# Patient Record
Sex: MALE | Race: WHITE | Employment: OTHER | URBAN - METROPOLITAN AREA
[De-identification: names, ages, dates, MRNs, and addresses within clinical notes are randomized per-mention and may not be internally consistent; named-entity substitution may affect disease eponyms.]

---

## 2020-05-27 RX ORDER — CLINDAMYCIN PHOSPHATE 600 MG/50ML
600 INJECTION INTRAVENOUS ONCE
Status: CANCELLED | OUTPATIENT
Start: 2020-05-28

## 2020-05-28 ENCOUNTER — ANESTHESIA EVENT (OUTPATIENT)
Dept: SURGERY | Facility: HOSPITAL | Age: 50
DRG: 455 | End: 2020-05-28
Payer: COMMERCIAL

## 2020-05-28 ENCOUNTER — APPOINTMENT (OUTPATIENT)
Dept: GENERAL RADIOLOGY | Facility: HOSPITAL | Age: 50
DRG: 455 | End: 2020-05-28
Attending: ORTHOPAEDIC SURGERY
Payer: COMMERCIAL

## 2020-05-28 ENCOUNTER — HOSPITAL ENCOUNTER (INPATIENT)
Facility: HOSPITAL | Age: 50
LOS: 1 days | Discharge: HOME | DRG: 455 | End: 2020-05-29
Attending: ORTHOPAEDIC SURGERY | Admitting: ORTHOPAEDIC SURGERY
Payer: COMMERCIAL

## 2020-05-28 ENCOUNTER — ANESTHESIA (OUTPATIENT)
Dept: SURGERY | Facility: HOSPITAL | Age: 50
DRG: 455 | End: 2020-05-28
Payer: COMMERCIAL

## 2020-05-28 DIAGNOSIS — M54.16 LUMBAR RADICULOPATHY: Primary | ICD-10-CM

## 2020-05-28 PROBLEM — M43.10 SPONDYLISTHESIS: Status: ACTIVE | Noted: 2020-05-28

## 2020-05-28 PROBLEM — F32.A DEPRESSION: Chronic | Status: ACTIVE | Noted: 2020-05-28

## 2020-05-28 PROCEDURE — BR191ZZ FLUOROSCOPY OF LUMBAR SPINE USING LOW OSMOLAR CONTRAST: ICD-10-PCS | Performed by: ORTHOPAEDIC SURGERY

## 2020-05-28 PROCEDURE — 99232 SBSQ HOSP IP/OBS MODERATE 35: CPT | Performed by: HOSPITALIST

## 2020-05-28 PROCEDURE — 76000 FLUOROSCOPY <1 HR PHYS/QHP: CPT | Performed by: ORTHOPAEDIC SURGERY

## 2020-05-28 PROCEDURE — 0SG00A0 FUSION OF LUMBAR VERTEBRAL JOINT WITH INTERBODY FUSION DEVICE, ANTERIOR APPROACH, ANTERIOR COLUMN, OPEN APPROACH: ICD-10-PCS | Performed by: ORTHOPAEDIC SURGERY

## 2020-05-28 PROCEDURE — 0SG0071 FUSION OF LUMBAR VERTEBRAL JOINT WITH AUTOLOGOUS TISSUE SUBSTITUTE, POSTERIOR APPROACH, POSTERIOR COLUMN, OPEN APPROACH: ICD-10-PCS | Performed by: ORTHOPAEDIC SURGERY

## 2020-05-28 PROCEDURE — 0SB20ZZ EXCISION OF LUMBAR VERTEBRAL DISC, OPEN APPROACH: ICD-10-PCS | Performed by: ORTHOPAEDIC SURGERY

## 2020-05-28 PROCEDURE — 4A11X4G MONITORING OF PERIPHERAL NERVOUS ELECTRICAL ACTIVITY, INTRAOPERATIVE, EXTERNAL APPROACH: ICD-10-PCS | Performed by: ORTHOPAEDIC SURGERY

## 2020-05-28 DEVICE — RELINE MAS RED SCRW 6.5X50 2C: Type: IMPLANTABLE DEVICE | Status: FUNCTIONAL

## 2020-05-28 DEVICE — RELINE LCK SCRW 5.5 OPEN TULIP: Type: IMPLANTABLE DEVICE | Status: FUNCTIONAL

## 2020-05-28 DEVICE — OSTEOCEL PRO LARGE BULK BUY: Type: IMPLANTABLE DEVICE | Site: BACK | Status: FUNCTIONAL

## 2020-05-28 DEVICE — RELINE MAS TI ROD 5.5X45 LRDTC: Type: IMPLANTABLE DEVICE | Status: FUNCTIONAL

## 2020-05-28 DEVICE — IMPLANTABLE DEVICE: Type: IMPLANTABLE DEVICE | Site: BACK | Status: FUNCTIONAL

## 2020-05-28 RX ORDER — DOCUSATE SODIUM 100 MG/1
100 CAPSULE, LIQUID FILLED ORAL 2 TIMES DAILY
Status: DISCONTINUED | OUTPATIENT
Start: 2020-05-28 | End: 2020-05-29

## 2020-05-28 RX ORDER — ACETAMINOPHEN 500 MG
1000 TABLET ORAL ONCE
Status: COMPLETED | OUTPATIENT
Start: 2020-05-28 | End: 2020-05-28

## 2020-05-28 RX ORDER — MORPHINE SULFATE 4 MG/ML
2 INJECTION, SOLUTION INTRAMUSCULAR; INTRAVENOUS EVERY 10 MIN PRN
Status: DISCONTINUED | OUTPATIENT
Start: 2020-05-28 | End: 2020-05-28 | Stop reason: HOSPADM

## 2020-05-28 RX ORDER — HYDROCODONE BITARTRATE AND ACETAMINOPHEN 5; 325 MG/1; MG/1
2 TABLET ORAL AS NEEDED
Status: DISCONTINUED | OUTPATIENT
Start: 2020-05-28 | End: 2020-05-28 | Stop reason: HOSPADM

## 2020-05-28 RX ORDER — LIDOCAINE HYDROCHLORIDE 10 MG/ML
INJECTION, SOLUTION EPIDURAL; INFILTRATION; INTRACAUDAL; PERINEURAL AS NEEDED
Status: DISCONTINUED | OUTPATIENT
Start: 2020-05-28 | End: 2020-05-28 | Stop reason: SURG

## 2020-05-28 RX ORDER — METOCLOPRAMIDE HYDROCHLORIDE 5 MG/ML
10 INJECTION INTRAMUSCULAR; INTRAVENOUS EVERY 6 HOURS PRN
Status: DISCONTINUED | OUTPATIENT
Start: 2020-05-28 | End: 2020-05-29

## 2020-05-28 RX ORDER — HYDROCODONE BITARTRATE AND ACETAMINOPHEN 5; 325 MG/1; MG/1
1 TABLET ORAL EVERY 4 HOURS PRN
Status: DISCONTINUED | OUTPATIENT
Start: 2020-05-28 | End: 2020-05-29

## 2020-05-28 RX ORDER — SODIUM CHLORIDE, SODIUM LACTATE, POTASSIUM CHLORIDE, CALCIUM CHLORIDE 600; 310; 30; 20 MG/100ML; MG/100ML; MG/100ML; MG/100ML
INJECTION, SOLUTION INTRAVENOUS CONTINUOUS
Status: DISCONTINUED | OUTPATIENT
Start: 2020-05-28 | End: 2020-05-29

## 2020-05-28 RX ORDER — HYDROCODONE BITARTRATE AND ACETAMINOPHEN 5; 325 MG/1; MG/1
1 TABLET ORAL AS NEEDED
Status: DISCONTINUED | OUTPATIENT
Start: 2020-05-28 | End: 2020-05-28 | Stop reason: HOSPADM

## 2020-05-28 RX ORDER — ONDANSETRON 2 MG/ML
4 INJECTION INTRAMUSCULAR; INTRAVENOUS ONCE AS NEEDED
Status: DISCONTINUED | OUTPATIENT
Start: 2020-05-28 | End: 2020-05-28 | Stop reason: HOSPADM

## 2020-05-28 RX ORDER — ROCURONIUM BROMIDE 10 MG/ML
INJECTION, SOLUTION INTRAVENOUS AS NEEDED
Status: DISCONTINUED | OUTPATIENT
Start: 2020-05-28 | End: 2020-05-28 | Stop reason: SURG

## 2020-05-28 RX ORDER — ONDANSETRON 2 MG/ML
INJECTION INTRAMUSCULAR; INTRAVENOUS AS NEEDED
Status: DISCONTINUED | OUTPATIENT
Start: 2020-05-28 | End: 2020-05-28 | Stop reason: SURG

## 2020-05-28 RX ORDER — PHENYLEPHRINE HCL 10 MG/ML
VIAL (ML) INJECTION AS NEEDED
Status: DISCONTINUED | OUTPATIENT
Start: 2020-05-28 | End: 2020-05-28 | Stop reason: SURG

## 2020-05-28 RX ORDER — DIPHENHYDRAMINE HYDROCHLORIDE 50 MG/ML
25 INJECTION INTRAMUSCULAR; INTRAVENOUS EVERY 4 HOURS PRN
Status: DISCONTINUED | OUTPATIENT
Start: 2020-05-28 | End: 2020-05-29

## 2020-05-28 RX ORDER — CYCLOBENZAPRINE HCL 10 MG
10 TABLET ORAL 3 TIMES DAILY PRN
Status: DISCONTINUED | OUTPATIENT
Start: 2020-05-28 | End: 2020-05-29

## 2020-05-28 RX ORDER — MORPHINE SULFATE 10 MG/ML
6 INJECTION, SOLUTION INTRAMUSCULAR; INTRAVENOUS EVERY 10 MIN PRN
Status: DISCONTINUED | OUTPATIENT
Start: 2020-05-28 | End: 2020-05-28 | Stop reason: HOSPADM

## 2020-05-28 RX ORDER — CLINDAMYCIN PHOSPHATE 150 MG/ML
600 INJECTION, SOLUTION INTRAVENOUS EVERY 6 HOURS
Status: DISCONTINUED | OUTPATIENT
Start: 2020-05-28 | End: 2020-05-28 | Stop reason: DRUGHIGH

## 2020-05-28 RX ORDER — HYDROMORPHONE HYDROCHLORIDE 1 MG/ML
INJECTION, SOLUTION INTRAMUSCULAR; INTRAVENOUS; SUBCUTANEOUS AS NEEDED
Status: DISCONTINUED | OUTPATIENT
Start: 2020-05-28 | End: 2020-05-28 | Stop reason: SURG

## 2020-05-28 RX ORDER — TRAMADOL HYDROCHLORIDE 50 MG/1
50 TABLET ORAL EVERY 6 HOURS
Status: DISCONTINUED | OUTPATIENT
Start: 2020-05-28 | End: 2020-05-29

## 2020-05-28 RX ORDER — SODIUM CHLORIDE, SODIUM LACTATE, POTASSIUM CHLORIDE, CALCIUM CHLORIDE 600; 310; 30; 20 MG/100ML; MG/100ML; MG/100ML; MG/100ML
INJECTION, SOLUTION INTRAVENOUS CONTINUOUS
Status: DISCONTINUED | OUTPATIENT
Start: 2020-05-28 | End: 2020-05-28 | Stop reason: HOSPADM

## 2020-05-28 RX ORDER — HYDROCODONE BITARTRATE AND ACETAMINOPHEN 5; 325 MG/1; MG/1
2 TABLET ORAL EVERY 4 HOURS PRN
Status: DISCONTINUED | OUTPATIENT
Start: 2020-05-28 | End: 2020-05-29

## 2020-05-28 RX ORDER — FAMOTIDINE 20 MG/1
20 TABLET ORAL ONCE
Status: COMPLETED | OUTPATIENT
Start: 2020-05-28 | End: 2020-05-28

## 2020-05-28 RX ORDER — CLINDAMYCIN PHOSPHATE 900 MG/50ML
900 INJECTION INTRAVENOUS ONCE
Status: COMPLETED | OUTPATIENT
Start: 2020-05-28 | End: 2020-05-28

## 2020-05-28 RX ORDER — POLYETHYLENE GLYCOL 3350 17 G/17G
17 POWDER, FOR SOLUTION ORAL DAILY PRN
Status: DISCONTINUED | OUTPATIENT
Start: 2020-05-28 | End: 2020-05-29

## 2020-05-28 RX ORDER — PROCHLORPERAZINE EDISYLATE 5 MG/ML
10 INJECTION INTRAMUSCULAR; INTRAVENOUS EVERY 6 HOURS PRN
Status: DISCONTINUED | OUTPATIENT
Start: 2020-05-28 | End: 2020-05-29

## 2020-05-28 RX ORDER — CLINDAMYCIN PHOSPHATE 900 MG/50ML
900 INJECTION INTRAVENOUS EVERY 6 HOURS
Status: COMPLETED | OUTPATIENT
Start: 2020-05-28 | End: 2020-05-28

## 2020-05-28 RX ORDER — BUPIVACAINE HYDROCHLORIDE AND EPINEPHRINE 5; 5 MG/ML; UG/ML
INJECTION, SOLUTION PERINEURAL AS NEEDED
Status: DISCONTINUED | OUTPATIENT
Start: 2020-05-28 | End: 2020-05-28 | Stop reason: HOSPADM

## 2020-05-28 RX ORDER — HYDROMORPHONE HYDROCHLORIDE 1 MG/ML
0.2 INJECTION, SOLUTION INTRAMUSCULAR; INTRAVENOUS; SUBCUTANEOUS EVERY 5 MIN PRN
Status: DISCONTINUED | OUTPATIENT
Start: 2020-05-28 | End: 2020-05-28 | Stop reason: HOSPADM

## 2020-05-28 RX ORDER — MORPHINE SULFATE 4 MG/ML
4 INJECTION, SOLUTION INTRAMUSCULAR; INTRAVENOUS EVERY 10 MIN PRN
Status: DISCONTINUED | OUTPATIENT
Start: 2020-05-28 | End: 2020-05-28 | Stop reason: HOSPADM

## 2020-05-28 RX ORDER — DEXAMETHASONE SODIUM PHOSPHATE 4 MG/ML
VIAL (ML) INJECTION AS NEEDED
Status: DISCONTINUED | OUTPATIENT
Start: 2020-05-28 | End: 2020-05-28 | Stop reason: SURG

## 2020-05-28 RX ORDER — DIPHENHYDRAMINE HCL 25 MG
25 CAPSULE ORAL EVERY 4 HOURS PRN
Status: DISCONTINUED | OUTPATIENT
Start: 2020-05-28 | End: 2020-05-29

## 2020-05-28 RX ORDER — HYDROMORPHONE HYDROCHLORIDE 1 MG/ML
0.6 INJECTION, SOLUTION INTRAMUSCULAR; INTRAVENOUS; SUBCUTANEOUS EVERY 5 MIN PRN
Status: DISCONTINUED | OUTPATIENT
Start: 2020-05-28 | End: 2020-05-28 | Stop reason: HOSPADM

## 2020-05-28 RX ORDER — BISACODYL 10 MG
10 SUPPOSITORY, RECTAL RECTAL
Status: DISCONTINUED | OUTPATIENT
Start: 2020-05-28 | End: 2020-05-29

## 2020-05-28 RX ORDER — SENNOSIDES 8.6 MG
17.2 TABLET ORAL NIGHTLY
Status: DISCONTINUED | OUTPATIENT
Start: 2020-05-28 | End: 2020-05-29

## 2020-05-28 RX ORDER — HYDROMORPHONE HYDROCHLORIDE 1 MG/ML
0.2 INJECTION, SOLUTION INTRAMUSCULAR; INTRAVENOUS; SUBCUTANEOUS EVERY 2 HOUR PRN
Status: DISCONTINUED | OUTPATIENT
Start: 2020-05-28 | End: 2020-05-29

## 2020-05-28 RX ORDER — ONDANSETRON 2 MG/ML
4 INJECTION INTRAMUSCULAR; INTRAVENOUS EVERY 4 HOURS PRN
Status: DISCONTINUED | OUTPATIENT
Start: 2020-05-28 | End: 2020-05-29

## 2020-05-28 RX ORDER — MIDAZOLAM HYDROCHLORIDE 1 MG/ML
INJECTION INTRAMUSCULAR; INTRAVENOUS AS NEEDED
Status: DISCONTINUED | OUTPATIENT
Start: 2020-05-28 | End: 2020-05-28 | Stop reason: SURG

## 2020-05-28 RX ORDER — ACETAMINOPHEN 325 MG/1
650 TABLET ORAL EVERY 4 HOURS PRN
Status: DISCONTINUED | OUTPATIENT
Start: 2020-05-28 | End: 2020-05-29

## 2020-05-28 RX ORDER — HYDROMORPHONE HYDROCHLORIDE 1 MG/ML
0.4 INJECTION, SOLUTION INTRAMUSCULAR; INTRAVENOUS; SUBCUTANEOUS EVERY 5 MIN PRN
Status: DISCONTINUED | OUTPATIENT
Start: 2020-05-28 | End: 2020-05-28 | Stop reason: HOSPADM

## 2020-05-28 RX ORDER — NALOXONE HYDROCHLORIDE 0.4 MG/ML
80 INJECTION, SOLUTION INTRAMUSCULAR; INTRAVENOUS; SUBCUTANEOUS AS NEEDED
Status: DISCONTINUED | OUTPATIENT
Start: 2020-05-28 | End: 2020-05-28 | Stop reason: HOSPADM

## 2020-05-28 RX ORDER — SODIUM PHOSPHATE, DIBASIC AND SODIUM PHOSPHATE, MONOBASIC 7; 19 G/133ML; G/133ML
1 ENEMA RECTAL ONCE AS NEEDED
Status: DISCONTINUED | OUTPATIENT
Start: 2020-05-28 | End: 2020-05-29

## 2020-05-28 RX ADMIN — ONDANSETRON 4 MG: 2 INJECTION INTRAMUSCULAR; INTRAVENOUS at 12:37:00

## 2020-05-28 RX ADMIN — DEXAMETHASONE SODIUM PHOSPHATE 4 MG: 4 MG/ML VIAL (ML) INJECTION at 11:02:00

## 2020-05-28 RX ADMIN — ONDANSETRON 4 MG: 2 INJECTION INTRAMUSCULAR; INTRAVENOUS at 11:02:00

## 2020-05-28 RX ADMIN — MIDAZOLAM HYDROCHLORIDE 2 MG: 1 INJECTION INTRAMUSCULAR; INTRAVENOUS at 10:48:00

## 2020-05-28 RX ADMIN — PHENYLEPHRINE HCL 100 MCG: 10 MG/ML VIAL (ML) INJECTION at 11:14:00

## 2020-05-28 RX ADMIN — SODIUM CHLORIDE, SODIUM LACTATE, POTASSIUM CHLORIDE, CALCIUM CHLORIDE: 600; 310; 30; 20 INJECTION, SOLUTION INTRAVENOUS at 13:00:00

## 2020-05-28 RX ADMIN — ROCURONIUM BROMIDE 10 MG: 10 INJECTION, SOLUTION INTRAVENOUS at 10:54:00

## 2020-05-28 RX ADMIN — CLINDAMYCIN PHOSPHATE 900 MG: 900 INJECTION INTRAVENOUS at 11:08:00

## 2020-05-28 RX ADMIN — HYDROMORPHONE HYDROCHLORIDE 0.5 MG: 1 INJECTION, SOLUTION INTRAMUSCULAR; INTRAVENOUS; SUBCUTANEOUS at 12:53:00

## 2020-05-28 RX ADMIN — DEXAMETHASONE SODIUM PHOSPHATE 4 MG: 4 MG/ML VIAL (ML) INJECTION at 12:37:00

## 2020-05-28 RX ADMIN — LIDOCAINE HYDROCHLORIDE 50 MG: 10 INJECTION, SOLUTION EPIDURAL; INFILTRATION; INTRACAUDAL; PERINEURAL at 10:54:00

## 2020-05-28 RX ADMIN — SODIUM CHLORIDE, SODIUM LACTATE, POTASSIUM CHLORIDE, CALCIUM CHLORIDE: 600; 310; 30; 20 INJECTION, SOLUTION INTRAVENOUS at 12:09:00

## 2020-05-28 NOTE — PROGRESS NOTES
Formerly [facility], [facility] Pharmacy Note: Antimicrobial Weight Based Dose Adjustment for: clindamycin (CLEOCIN)    Preston Hughes is a 54year old male who has been prescribed clindamycin (CLEOCIN) 600 mg every 6 hours x 2 dose.     CrCl cannot be calculated (No successful lab v

## 2020-05-28 NOTE — H&P
[address] Patient Status:  Inpatient    12/15/1970 MRN [id number]   Location [facility] Attending Lonni Nageotte, MD   Hosp Day # 0 PCP No primary care provider on file.      Active Problems:

## 2020-05-28 NOTE — ANESTHESIA POSTPROCEDURE EVALUATION
Patient: Shaheed Fernandez    Procedure Summary     Date:  05/28/20 Room / Location:  [facility] OR 09 / [facility] OR    Anesthesia Start:  2817 Anesthesia Stop:  1300    Procedure:  FAR LATERAL LUMBAR INTERBODY FUSION 1 LEVEL (N/A ) Diagnosis:  (lumbar radicul

## 2020-05-28 NOTE — RESPIRATORY THERAPY NOTE
DANI ASSESSMENT:    Pt does have a previous diagnosis of DANI. Pt does not routinely use a CPAP device at home and does not want to use CPAP during his hospital stay.      CPAP INITIATION:    Pt to be placed on CPAP: no  Pt refused: yes

## 2020-05-28 NOTE — ANESTHESIA PREPROCEDURE EVALUATION
Anesthesia PreOp Note    HPI:     Sandy Dee is a 54year old male who presents for preoperative consultation requested by: Clyde Lopez MD    Date of Surgery: 5/28/2020    Procedure(s):  FAR LATERAL LUMBAR INTERBODY FUSION 1 LEVEL  Indication: lum Physical activity:        Days per week: Not on file        Minutes per session: Not on file      Stress: Not on file    Relationships      Social connections:        Talks on phone: Not on file        Gets together: Not on file        Attends Denominational - normal exam               Anesthesia Plan:   ASA:  2  Plan:   General  Airway:  ETT  Post-op Pain Management: IV analgesics  Plan Comments: NPO for procedure.    Informed Consent Plan and Risks Discussed With:  Patient  Use of Blood Products Discussed Wi

## 2020-05-28 NOTE — PROGRESS NOTES
[facility]    Progress Note    Joe Skaggs Patient Status:  Inpatient    12/15/1970 MRN [id number]   Location [address] [facility] Attending Brinda Rojas MD   Hosp Day # 0 PCP No primary care provider on fusion. PAIN CONTROL, NEURO CHECKS, DVT PROPHYLAXIS, PT/OT. Depression  CONT HOME MEDS.              Results:   No results found for: WBC, HGB, HCT, PLT, CREATSERUM, BUN, NA, K, CL, CO2, GLU, CA, ALB, ALKPHO, BILT, TP, AST, ALT, PTT, INR, PT, T4F, TS

## 2020-05-28 NOTE — ANESTHESIA PROCEDURE NOTES
Airway  Date/Time: 5/28/2020 10:57 AM  Urgency: Elective    Airway not difficult    General Information and Staff    Patient location during procedure: OR  Anesthesiologist: Margo Escobar MD  Resident/CRNA: Maday Ko CRNA    Indications and P

## 2020-05-28 NOTE — OPERATIVE REPORT
PATIENT  To Blanco    DATE OF SURGERY  5/28/20    SURGEON   Wilber Holt MD    ASSISTANT  Yesi aPyne    PREOPERATIVE DIAGNOSIS   1. Chronic low back pain  2. Lumbar radiculopathy  3. L4-5 listhesis  4. L4-5 Disk degeneration  5.  L4-5 Spinal Iven Couch dissection. Fascia was then penetrated with a curved 6   instrument and the retroperitoneal space was entered. A finger   was introduced into the retroperitoneal space, which was opened   up.  The psoas muscle was palpated and the peritoneum was   SPARROW [facility] The lower back was   prepped and draped in the usual fashion for surgery and local   anesthetic was infiltrated into the paraspinal area.  Under   fluoroscopy, an incision was made immediately lateral to the L4   and L5 pedicles on the left side under bipla

## 2020-05-29 ENCOUNTER — APPOINTMENT (OUTPATIENT)
Dept: GENERAL RADIOLOGY | Facility: HOSPITAL | Age: 50
DRG: 455 | End: 2020-05-29
Attending: ORTHOPAEDIC SURGERY
Payer: COMMERCIAL

## 2020-05-29 VITALS
HEART RATE: 67 BPM | OXYGEN SATURATION: 93 % | SYSTOLIC BLOOD PRESSURE: 134 MMHG | DIASTOLIC BLOOD PRESSURE: 83 MMHG | HEIGHT: 71 IN | RESPIRATION RATE: 18 BRPM | BODY MASS INDEX: 33.74 KG/M2 | WEIGHT: 241 LBS | TEMPERATURE: 98 F

## 2020-05-29 PROCEDURE — 99239 HOSP IP/OBS DSCHRG MGMT >30: CPT | Performed by: HOSPITALIST

## 2020-05-29 PROCEDURE — 72100 X-RAY EXAM L-S SPINE 2/3 VWS: CPT | Performed by: ORTHOPAEDIC SURGERY

## 2020-05-29 NOTE — PLAN OF CARE
Patient A/Ox4, VSS. Weaned to room air. Afebrile. Voiding freely, saline locked per protocol. Tolerating general diet, denies nausea. States numbness is present in left hip through upper left thigh. Pain treated with Norco PRN.  Ambulating with standby assi Include patient/family/discharge partner in discharge planning  - Arrange for needed discharge resources and transportation as appropriate  - Identify discharge learning needs (meds, wound care, etc)  - Arrange for interpreters to assist at discharge as ne

## 2020-05-29 NOTE — DISCHARGE SUMMARY
[facility]    Discharge Summary    Toño Kaur Patient Status:  Inpatient    12/15/1970 MRN [id number]   Location [facility] 4W/SW/SE Attending Ana Maria Medina MD   [facility] Day # 1 PCP No primary care provider on file.      Christopher

## 2020-05-29 NOTE — PLAN OF CARE
Guille Gil is now POD#1 after having an L4-L5 lateral fusion. He is up with standby assist and the walker and has a steady gait. Was able to walk full lap in hallway (approx. 436ft) this evening.  Scheduled tramadol, PRN norco, PRN flexeril and PRN IV dilaudid fo pt/family on patient safety including physical limitations  - Instruct pt to call for assistance with activity based on assessment  - Modify environment to reduce risk of injury  - Provide assistive devices as appropriate  - Consider OT/PT consult to юлия and reinforce with patient and family use of appropriate assistive device and precautions (e.g. spinal or hip dislocation precautions)  Outcome: Progressing

## 2020-05-29 NOTE — PHYSICAL THERAPY NOTE
PHYSICAL THERAPY QUICK EVALUATION - INPATIENT    Room Number: 405/405-A  Evaluation Date: 5/29/2020  Presenting Problem: s/p L4-5 anteriorlateral interbody fusion  Physician Order: PT Eval and Treat    Problem List  Principal Problem:    Spondylisthesis have...  -   Turning over in bed (including adjusting bedclothes, sheets and blankets)?: None   -   Sitting down on and standing up from a chair with arms (e.g., wheelchair, bedside commode, etc.): None   -   Moving from lying on back to sitting on the lópez through pattern at supervision, good balance throughout. Educated pt on home walking program, pacing activity, pain management strategies, pt with good understanding. Pt weaned to room air with mobility, SPO2 94% on room air, HR 89 bpm post-activity.  Pt

## 2020-12-04 PROBLEM — F43.22 ADJUSTMENT DISORDER WITH ANXIOUS MOOD: Status: ACTIVE | Noted: 2020-12-04

## 2020-12-04 PROBLEM — F33.9 CHRONIC RECURRENT MAJOR DEPRESSIVE DISORDER (HCC): Status: ACTIVE | Noted: 2020-12-04

## 2023-04-28 ENCOUNTER — APPOINTMENT (OUTPATIENT)
Dept: URBAN - METROPOLITAN AREA CLINIC 247 | Age: 53
Setting detail: DERMATOLOGY
End: 2023-05-02

## 2023-04-28 DIAGNOSIS — Q828 OTHER SPECIFIED ANOMALIES OF SKIN: ICD-10-CM

## 2023-04-28 DIAGNOSIS — L82.1 OTHER SEBORRHEIC KERATOSIS: ICD-10-CM

## 2023-04-28 DIAGNOSIS — D49.2 NEOPLASM OF UNSPECIFIED BEHAVIOR OF BONE, SOFT TISSUE, AND SKIN: ICD-10-CM

## 2023-04-28 DIAGNOSIS — L91.8 OTHER HYPERTROPHIC DISORDERS OF THE SKIN: ICD-10-CM

## 2023-04-28 DIAGNOSIS — L57.8 OTHER SKIN CHANGES DUE TO CHRONIC EXPOSURE TO NONIONIZING RADIATION: ICD-10-CM

## 2023-04-28 DIAGNOSIS — D22 MELANOCYTIC NEVI: ICD-10-CM

## 2023-04-28 DIAGNOSIS — Q826 OTHER SPECIFIED ANOMALIES OF SKIN: ICD-10-CM

## 2023-04-28 DIAGNOSIS — D18.0 HEMANGIOMA: ICD-10-CM

## 2023-04-28 DIAGNOSIS — Q819 OTHER SPECIFIED ANOMALIES OF SKIN: ICD-10-CM

## 2023-04-28 PROBLEM — D22.5 MELANOCYTIC NEVI OF TRUNK: Status: ACTIVE | Noted: 2023-04-28

## 2023-04-28 PROBLEM — D18.01 HEMANGIOMA OF SKIN AND SUBCUTANEOUS TISSUE: Status: ACTIVE | Noted: 2023-04-28

## 2023-04-28 PROBLEM — L85.8 OTHER SPECIFIED EPIDERMAL THICKENING: Status: ACTIVE | Noted: 2023-04-28

## 2023-04-28 PROCEDURE — OTHER COUNSELING: OTHER

## 2023-04-28 PROCEDURE — 11301 SHAVE SKIN LESION 0.6-1.0 CM: CPT

## 2023-04-28 PROCEDURE — OTHER SHAVE REMOVAL: OTHER

## 2023-04-28 PROCEDURE — OTHER MIPS QUALITY: OTHER

## 2023-04-28 PROCEDURE — A4550 SURGICAL TRAYS: HCPCS

## 2023-04-28 PROCEDURE — 99203 OFFICE O/P NEW LOW 30 MIN: CPT | Mod: 25

## 2023-04-28 ASSESSMENT — LOCATION DETAILED DESCRIPTION DERM
LOCATION DETAILED: LEFT MEDIAL UPPER BACK
LOCATION DETAILED: RIGHT BUTTOCK
LOCATION DETAILED: LEFT SUPERIOR MEDIAL UPPER BACK
LOCATION DETAILED: RIGHT MEDIAL SUPERIOR CHEST
LOCATION DETAILED: RIGHT PROXIMAL POSTERIOR UPPER ARM
LOCATION DETAILED: LEFT INFERIOR ANTERIOR NECK
LOCATION DETAILED: LEFT PROXIMAL POSTERIOR UPPER ARM
LOCATION DETAILED: LEFT INFERIOR MEDIAL MALAR CHEEK
LOCATION DETAILED: UPPER STERNUM
LOCATION DETAILED: LEFT INFERIOR MEDIAL UPPER BACK

## 2023-04-28 ASSESSMENT — LOCATION ZONE DERM
LOCATION ZONE: NECK
LOCATION ZONE: ARM
LOCATION ZONE: TRUNK
LOCATION ZONE: FACE

## 2023-04-28 ASSESSMENT — LOCATION SIMPLE DESCRIPTION DERM
LOCATION SIMPLE: CHEST
LOCATION SIMPLE: LEFT UPPER ARM
LOCATION SIMPLE: LEFT CHEEK
LOCATION SIMPLE: LEFT ANTERIOR NECK
LOCATION SIMPLE: RIGHT BUTTOCK
LOCATION SIMPLE: LEFT UPPER BACK
LOCATION SIMPLE: RIGHT UPPER ARM

## 2023-04-28 NOTE — PROCEDURE: SHAVE REMOVAL
Detail Level: Detailed
Size Of Lesion In Cm (Required): 0.7
Anesthesia Type: 1% lidocaine with epinephrine
Anesthesia Volume In Cc: 3
Wound Care: Petrolatum
Render Path Notes In Note?: No
Medical Necessity Information: It is in your best interest to select a reason for this procedure from the list below. All of these items fulfill various CMS LCD requirements except the new and changing color options.
Hemostasis: Electrocautery
Bill For Surgical Tray: yes
Path Notes (To The Dermatopathologist): Please check margins
Consent was obtained from the patient. The risks and benefits to therapy were discussed in detail. Specifically, the risks of infection, scarring, bleeding, prolonged wound healing, incomplete removal, allergy to anesthesia, nerve injury and recurrence were addressed. Prior to the procedure, the treatment site was clearly identified and confirmed by the patient. All components of Universal Protocol/PAUSE Rule completed.
Medical Necessity Clause: This procedure was medically necessary because the lesion that was treated was:
Body Location Override (Optional - Billing Will Still Be Based On Selected Body Map Location If Applicable): left mid back near midline
Biopsy Method: Dermablade
Notification Instructions: Patient will be notified of pathology results. However, patient instructed to call the office if not contacted within 2 weeks.
Billing Type: Third-Party Bill
Post-Care Instructions: I reviewed with the patient in detail post-care instructions. Patient is to keep the biopsy site dry overnight, and then apply bacitracin twice daily until healed. Patient may apply hydrogen peroxide soaks to remove any crusting.
Depth Of Shave: dermis
X Size Of Lesion In Cm (Optional): 0

## (undated) DEVICE — LAMINECTOMY: Brand: MEDLINE INDUSTRIES, INC.

## (undated) DEVICE — WIRE FX PRCPT KRSH BVL BLNT

## (undated) DEVICE — NV I-PAS III BEVELED TIP STER

## (undated) DEVICE — PEN: MARKING STD PT 100/CS: Brand: MEDICAL ACTION INDUSTRIES

## (undated) DEVICE — 3M™ IOBAN™ 2 ANTIMICROBIAL INCISE DRAPE 6640EZ: Brand: IOBAN™ 2

## (undated) DEVICE — GAUZE SPONGES,12 PLY: Brand: CURITY

## (undated) DEVICE — KIT ACCESS MAXCESS 4

## (undated) DEVICE — SUTURE VICRYL 2-0 CT-1

## (undated) DEVICE — POLAR CARE CUBE COOLING UNIT

## (undated) DEVICE — C-ARMOR C-ARM EQUIPMENT COVERS CLEAR STERILE UNIVERSAL FIT 12 PER CASE: Brand: C-ARMOR

## (undated) DEVICE — RELINE MAS BLADE FASCIAL SPLIT

## (undated) DEVICE — RELINE POWER

## (undated) DEVICE — 3M™ STERI-DRAPE™ U-DRAPE 1015: Brand: STERI-DRAPE™

## (undated) DEVICE — UNDYED BRAIDED (POLYGLACTIN 910), SYNTHETIC ABSORBABLE SUTURE: Brand: COATED VICRYL

## (undated) DEVICE — 3M™ TEGADERM™ TRANSPARENT FILM DRESSING, 1626W, 4 IN X 4-3/4 IN (10 CM X 12 CM), 50 EACH/CARTON, 4 CARTON/CASE: Brand: 3M™ TEGADERM™

## (undated) DEVICE — NON-ADHERENT PAD PREPACK: Brand: TELFA

## (undated) DEVICE — WRAP THRP PLRCR500 BCK DRSG

## (undated) DEVICE — PROXIMATE SKIN STAPLERS (35 WIDE) CONTAINS 35 STAINLESS STEEL STAPLES (FIXED HEAD): Brand: PROXIMATE

## (undated) DEVICE — DRAPE SRG 90X60IN BCK TBL CVR

## (undated) DEVICE — KIT SPNL XLIF NRVSN DIL M5

## (undated) DEVICE — PAD THRP WRPON PLR LNG STRAP

## (undated) DEVICE — SUTURE MONOCRYL 3-0 Y936H

## (undated) DEVICE — NVM5 MULTIMODALITY SURFACE KIT

## (undated) DEVICE — SOL  .9 1000ML BTL